# Patient Record
Sex: MALE | Race: WHITE | NOT HISPANIC OR LATINO | Employment: FULL TIME | URBAN - METROPOLITAN AREA
[De-identification: names, ages, dates, MRNs, and addresses within clinical notes are randomized per-mention and may not be internally consistent; named-entity substitution may affect disease eponyms.]

---

## 2022-05-26 ENCOUNTER — OFFICE VISIT (OUTPATIENT)
Dept: UROLOGY | Facility: CLINIC | Age: 47
End: 2022-05-26

## 2022-05-26 VITALS
BODY MASS INDEX: 29.51 KG/M2 | DIASTOLIC BLOOD PRESSURE: 66 MMHG | SYSTOLIC BLOOD PRESSURE: 121 MMHG | WEIGHT: 188 LBS | HEIGHT: 67 IN | HEART RATE: 69 BPM | TEMPERATURE: 98.7 F

## 2022-05-26 DIAGNOSIS — R35.0 BENIGN PROSTATIC HYPERPLASIA WITH URINARY FREQUENCY: Primary | ICD-10-CM

## 2022-05-26 DIAGNOSIS — R35.1 NOCTURIA: ICD-10-CM

## 2022-05-26 DIAGNOSIS — R35.0 URINARY FREQUENCY: ICD-10-CM

## 2022-05-26 DIAGNOSIS — N40.1 BENIGN PROSTATIC HYPERPLASIA WITH URINARY FREQUENCY: Primary | ICD-10-CM

## 2022-05-26 PROBLEM — K59.00 CONSTIPATION: Status: ACTIVE | Noted: 2022-04-05

## 2022-05-26 PROBLEM — E78.2 MIXED HYPERLIPIDEMIA: Status: ACTIVE | Noted: 2022-04-05

## 2022-05-26 LAB
BILIRUB BLD-MCNC: NEGATIVE MG/DL
CLARITY, POC: CLEAR
COLOR UR: YELLOW
GLUCOSE UR STRIP-MCNC: NEGATIVE MG/DL
KETONES UR QL: NEGATIVE
LEUKOCYTE EST, POC: NEGATIVE
NITRITE UR-MCNC: NEGATIVE MG/ML
PH UR: 5.5 [PH] (ref 5–8)
PROT UR STRIP-MCNC: NEGATIVE MG/DL
RBC # UR STRIP: NEGATIVE /UL
SP GR UR: 1.01 (ref 1–1.03)
UROBILINOGEN UR QL: NORMAL

## 2022-05-26 PROCEDURE — 99203 OFFICE O/P NEW LOW 30 MIN: CPT | Performed by: UROLOGY

## 2022-05-26 PROCEDURE — 81002 URINALYSIS NONAUTO W/O SCOPE: CPT | Performed by: UROLOGY

## 2022-05-26 RX ORDER — POLYETHYLENE GLYCOL 3350 17 G/17G
17 POWDER, FOR SOLUTION ORAL DAILY
COMMUNITY
Start: 2022-04-05

## 2022-05-26 RX ORDER — CHLORAL HYDRATE 500 MG
CAPSULE ORAL DAILY
COMMUNITY

## 2022-05-26 RX ORDER — TAMSULOSIN HYDROCHLORIDE 0.4 MG/1
CAPSULE ORAL
COMMUNITY
Start: 2022-05-03 | End: 2022-11-09 | Stop reason: SDUPTHER

## 2022-05-26 NOTE — PROGRESS NOTES
"Chief Complaint  Benign Prostatic Hypertrophy and Urinary Frequency    Nocturia    Subjective  No acute distress        Ziyad Rueda presents to [facility] UROLOGY  History of Present Illness    47-year-old white male has been having urinary frequency and nocturia for about 1 year's time.  No dysuria.  No gross hematuria.  Patient has nocturia about 4 times.  Frequency and sometimes he has to go every 30 minutes.  Patient has urgency and does not empty his bladder completely 80% of the time.    No family history of prostate cancer.  No history of diabetes mellitus    Objective No acute distress  Vital Signs:   /66   Pulse 69   Temp 98.7 °F (37.1 °C)   Ht 170.2 cm (67\")   Wt 85.3 kg (188 lb)   BMI 29.44 kg/m²     No Known Allergies   Past medical history:  has a past medical history of Anemia, Frequency of micturition, and Lymphocytosis.   Past surgical history:  has a past surgical history that includes Nose surgery.  Personal history: family history includes Heart disease in his father; Hypertension in his father.  Social history:  reports that he has never smoked. He has never used smokeless tobacco. He reports that he does not drink alcohol and does not use drugs.  Patient is  and has 2 children.    Review of Systems    Please see past medical and surgical history rest of the system is negative    Physical Exam  Constitutional:       General: He is not in acute distress.     Appearance: Normal appearance. He is normal weight. He is not ill-appearing or toxic-appearing.   HENT:      Head: Normocephalic and atraumatic.      Ears:      Comments: No loss of hearing     Mouth/Throat:      Mouth: Mucous membranes are moist.   Eyes:      Pupils: Pupils are equal, round, and reactive to light.   Cardiovascular:      Rate and Rhythm: Normal rate.      Pulses: Normal pulses.      Heart sounds: Normal heart sounds. No murmur heard.  Pulmonary:      Breath sounds: No rhonchi or " rales.   Abdominal:      General: Abdomen is flat.      Palpations: Abdomen is soft. There is no mass.      Tenderness: There is no abdominal tenderness. There is no right CVA tenderness or left CVA tenderness.      Hernia: No hernia is present.   Genitourinary:     Comments: Uncircumcised normal penis    Right left testicle and epididymis is normal    Scrotum is normal on both sides.    Prostate gland is just about 15 g and benign  Musculoskeletal:         General: No swelling. Normal range of motion.      Cervical back: Normal range of motion and neck supple. No rigidity or tenderness.      Right lower leg: No edema.      Left lower leg: No edema.   Lymphadenopathy:      Cervical: No cervical adenopathy.   Skin:     General: Skin is warm.      Coloration: Skin is not jaundiced.      Findings: No lesion.   Neurological:      General: No focal deficit present.      Mental Status: He is alert and oriented to person, place, and time. Mental status is at baseline.      Motor: No weakness.      Gait: Gait normal.   Psychiatric:         Mood and Affect: Mood normal.         Behavior: Behavior normal.         Thought Content: Thought content normal.         Judgment: Judgment normal.        Result Review :                 Assessment and Plan    Diagnoses and all orders for this visit:    1. Benign prostatic hyperplasia with urinary frequency (Primary)  -     POC Urinalysis Dipstick    2. Urinary frequency    3. Nocturia      Will do bladder diary and cystoscopy.  Patient has no suprapubic pain so I do not suspect IC.  Brief Urine Lab Results  (Last result in the past 365 days)      Color   Clarity   Blood   Leuk Est   Nitrite   Protein   CREAT   Urine HCG        05/26/22 1226 Yellow   Clear   Negative   Negative   Negative   Negative                  Follow Up   No follow-ups on file.  Patient was given instructions and counseling regarding his condition or for health maintenance advice. Please see specific information  pulled into the AVS if appropriate.     Madison Up MD

## 2022-06-14 ENCOUNTER — PROCEDURE VISIT (OUTPATIENT)
Dept: UROLOGY | Facility: CLINIC | Age: 47
End: 2022-06-14

## 2022-06-14 DIAGNOSIS — R35.0 BENIGN PROSTATIC HYPERPLASIA WITH URINARY FREQUENCY: Primary | ICD-10-CM

## 2022-06-14 DIAGNOSIS — N40.1 BENIGN PROSTATIC HYPERPLASIA WITH URINARY FREQUENCY: Primary | ICD-10-CM

## 2022-06-14 DIAGNOSIS — N32.81 OVERACTIVE BLADDER: ICD-10-CM

## 2022-06-14 PROCEDURE — 51741 ELECTRO-UROFLOWMETRY FIRST: CPT | Performed by: UROLOGY

## 2022-06-14 PROCEDURE — 52000 CYSTOURETHROSCOPY: CPT | Performed by: UROLOGY

## 2022-06-14 PROCEDURE — 51798 US URINE CAPACITY MEASURE: CPT | Performed by: UROLOGY

## 2022-06-14 NOTE — PROGRESS NOTES
Cystoscopy    Date/Time: 6/14/2022 1:22 PM  Performed by: Madison Up MD  Authorized by: Madison Up MD   Preparation: Patient was prepped and draped in the usual sterile fashion.  Local anesthesia used: yes    Anesthesia:  Local anesthesia used: yes  Local Anesthetic: topical anesthetic  Anesthetic total: 12 mL    Sedation:  Patient sedated: no        Indication.  BPH with obstructive uropathy and frequency.    Bladder diary is seen and his bladder volume varies from 400 mL to 25 ml    Patient was placed in lithotomy position.  Thorough scrubbing of lower abdomen and external genitalia was performed with Hibiclens.  18 Olympus flexible cystoscope was inserted into the urethra, which was normal.  Prostate gland is large and obstructive.  Patient has large lateral lobes and no median lobe.  Prostatic urethra was little tight.  Bladder is trabeculated.  Both ureteral orifices are normal.  Bladder was looked at carefully and I do not see any bladder tumors.  There was no evidence of vesicocolic fistula.  Bladder blood vessels are little congested but I do not see any changes of interstitial cystitis.  Patient had no pain when the bladder was full.  Flexible cystoscope was removed and patient tolerated the procedure very well.    Uroflow.    Q-Douglas.  12.2 mL/s    Average flow.  2.9 mL/s    Voiding volume.  345    Ultrasound residual with bladder scan.  3.5 MHz transducer was used which was placed in the suprapubic area and fluid in the urinary bladder was calculated.  Patient has residual of 14 mL.    Procedure was pretty uncomfortable for the patient.  I do not see any changes of interstitial cystitis.  Patient stopped taking tamsulosin so I will put him back on tamsulosin 0.4 mg PC at the same time daily and given 2 weeks course of 75 mg Gemtesa and see him back in 2 weeks to see how he is doing.

## 2022-07-07 ENCOUNTER — OFFICE VISIT (OUTPATIENT)
Dept: UROLOGY | Facility: CLINIC | Age: 47
End: 2022-07-07

## 2022-07-07 VITALS
TEMPERATURE: 98.9 F | BODY MASS INDEX: 25.74 KG/M2 | WEIGHT: 164 LBS | DIASTOLIC BLOOD PRESSURE: 71 MMHG | SYSTOLIC BLOOD PRESSURE: 120 MMHG | HEART RATE: 88 BPM | HEIGHT: 67 IN

## 2022-07-07 DIAGNOSIS — R35.0 BENIGN PROSTATIC HYPERPLASIA WITH URINARY FREQUENCY: ICD-10-CM

## 2022-07-07 DIAGNOSIS — R39.15 URINARY URGENCY: Primary | ICD-10-CM

## 2022-07-07 DIAGNOSIS — N40.1 BENIGN PROSTATIC HYPERPLASIA WITH URINARY FREQUENCY: ICD-10-CM

## 2022-07-07 LAB
BILIRUB BLD-MCNC: NEGATIVE MG/DL
CLARITY, POC: CLEAR
COLOR UR: YELLOW
EXPIRATION DATE: NORMAL
GLUCOSE UR STRIP-MCNC: NEGATIVE MG/DL
KETONES UR QL: NEGATIVE
LEUKOCYTE EST, POC: NEGATIVE
Lab: NORMAL
NITRITE UR-MCNC: NEGATIVE MG/ML
PH UR: 5 [PH] (ref 5–8)
PROT UR STRIP-MCNC: NEGATIVE MG/DL
RBC # UR STRIP: NEGATIVE /UL
SP GR UR: 1.03 (ref 1–1.03)
UROBILINOGEN UR QL: NORMAL

## 2022-07-07 PROCEDURE — 81003 URINALYSIS AUTO W/O SCOPE: CPT | Performed by: UROLOGY

## 2022-07-07 PROCEDURE — 99213 OFFICE O/P EST LOW 20 MIN: CPT | Performed by: UROLOGY

## 2022-07-07 RX ORDER — VIBEGRON 75 MG/1
75 TABLET, FILM COATED ORAL DAILY
Qty: 90 TABLET | Refills: 3 | Status: SHIPPED | OUTPATIENT
Start: 2022-07-07 | End: 2022-10-05

## 2022-07-07 NOTE — PROGRESS NOTES
"Chief Complaint  Benign Prostatic Hypertrophy    Urinary urgency and frequency    Subjective     {Problem List  Visit Diagnosis   Encounters  Notes  Medications  Labs  Result Review Imaging  Media :23}     Ziyad Rueda presents to [facility] UROLOGY  History of Present Illness    Patient is doing better with the combination of Flomax and Gemtesa.  His nocturia and frequency has improved.  He is urinating easier than before    Objective No acute distress  Vital Signs:   /71   Pulse 88   Temp 98.9 °F (37.2 °C) (Infrared)   Ht 170.2 cm (67\")   Wt 74.4 kg (164 lb)   BMI 25.69 kg/m²     No Known Allergies   Past medical history:  has a past medical history of Anemia, Benign prostatic hyperplasia, Frequency of micturition, and Lymphocytosis.   Past surgical history:  has a past surgical history that includes Nose surgery and Cystoscopy.  Personal history: family history includes Heart disease in his father; Hypertension in his father.  Social history:  reports that he has never smoked. He has never used smokeless tobacco. He reports that he does not drink alcohol and does not use drugs.    Review of Systems    No change from before    Physical Exam  Constitutional:       General: He is not in acute distress.     Appearance: Normal appearance. He is normal weight. He is not ill-appearing or toxic-appearing.   HENT:      Head: Normocephalic and atraumatic.      Ears:      Comments: No loss of hearing  Pulmonary:      Effort: Pulmonary effort is normal.   Abdominal:      General: Abdomen is flat.      Palpations: Abdomen is soft.   Genitourinary:     Penis: Normal.       Testes: Normal.   Musculoskeletal:         General: Normal range of motion.   Skin:     General: Skin is warm.      Coloration: Skin is not jaundiced.   Neurological:      General: No focal deficit present.      Mental Status: He is alert and oriented to person, place, and time.      Motor: No weakness.      Gait: " Gait normal.   Psychiatric:         Mood and Affect: Mood normal.         Behavior: Behavior normal.         Thought Content: Thought content normal.         Judgment: Judgment normal.        Result Review :                 Assessment and Plan    Diagnoses and all orders for this visit:    1. Benign prostatic hyperplasia with urinary frequency (Primary)  -     POC Urinalysis Dipstick, Automated    2. Urinary urgency      Will continue tamsulosin 0.4 mg daily PCHS and Gemtesa 75 mg daily and recheck him in 4 months time  Brief Urine Lab Results  (Last result in the past 365 days)      Color   Clarity   Blood   Leuk Est   Nitrite   Protein   CREAT   Urine HCG        07/07/22 1248 Yellow   Clear   Negative   Negative   Negative   Negative                  Follow Up   Return in about 4 months (around 11/7/2022).  Patient was given instructions and counseling regarding his condition or for health maintenance advice. Please see specific information pulled into the AVS if appropriate.     Madison Up MD

## 2022-07-20 ENCOUNTER — TRANSCRIBE ORDERS (OUTPATIENT)
Dept: ADMINISTRATIVE | Facility: HOSPITAL | Age: 47
End: 2022-07-20

## 2022-07-20 DIAGNOSIS — C94.80: Primary | ICD-10-CM

## 2022-07-21 ENCOUNTER — HOSPITAL ENCOUNTER (OUTPATIENT)
Dept: PET IMAGING | Facility: HOSPITAL | Age: 47
Discharge: HOME | End: 2022-07-21

## 2022-07-21 DIAGNOSIS — C94.80: ICD-10-CM

## 2022-07-21 PROCEDURE — 78815 PET IMAGE W/CT SKULL-THIGH: CPT

## 2022-07-21 PROCEDURE — A9552 F18 FDG: HCPCS | Performed by: INTERNAL MEDICINE

## 2022-07-21 PROCEDURE — 0 FLUDEOXYGLUCOSE F18 SOLUTION: Performed by: INTERNAL MEDICINE

## 2022-07-21 RX ADMIN — FLUDEOXYGLUCOSE F18 1 DOSE: 300 INJECTION INTRAVENOUS at 08:50

## 2022-07-28 DIAGNOSIS — N32.81 OVERACTIVE BLADDER: Primary | ICD-10-CM

## 2022-07-28 RX ORDER — OXYBUTYNIN CHLORIDE 5 MG/1
5 TABLET, EXTENDED RELEASE ORAL DAILY
Qty: 30 TABLET | Refills: 2 | Status: SHIPPED | OUTPATIENT
Start: 2022-07-28 | End: 2022-11-09 | Stop reason: SDUPTHER

## 2022-11-09 ENCOUNTER — OFFICE VISIT (OUTPATIENT)
Dept: UROLOGY | Facility: CLINIC | Age: 47
End: 2022-11-09

## 2022-11-09 VITALS
SYSTOLIC BLOOD PRESSURE: 114 MMHG | DIASTOLIC BLOOD PRESSURE: 64 MMHG | HEART RATE: 59 BPM | HEIGHT: 67 IN | BODY MASS INDEX: 26.53 KG/M2 | TEMPERATURE: 98 F | WEIGHT: 169 LBS

## 2022-11-09 DIAGNOSIS — N32.81 OVERACTIVE BLADDER: ICD-10-CM

## 2022-11-09 DIAGNOSIS — N13.8 BPH WITH OBSTRUCTION/LOWER URINARY TRACT SYMPTOMS: Primary | ICD-10-CM

## 2022-11-09 DIAGNOSIS — N40.1 BPH WITH OBSTRUCTION/LOWER URINARY TRACT SYMPTOMS: Primary | ICD-10-CM

## 2022-11-09 LAB
BILIRUB BLD-MCNC: NEGATIVE MG/DL
CLARITY, POC: CLEAR
COLOR UR: YELLOW
EXPIRATION DATE: NORMAL
GLUCOSE UR STRIP-MCNC: NEGATIVE MG/DL
KETONES UR QL: NEGATIVE
LEUKOCYTE EST, POC: NEGATIVE
Lab: NORMAL
NITRITE UR-MCNC: NEGATIVE MG/ML
PH UR: 7.5 [PH] (ref 5–8)
PROT UR STRIP-MCNC: NEGATIVE MG/DL
RBC # UR STRIP: NEGATIVE /UL
SP GR UR: 1.02 (ref 1–1.03)
UROBILINOGEN UR QL: NORMAL

## 2022-11-09 PROCEDURE — 99214 OFFICE O/P EST MOD 30 MIN: CPT | Performed by: UROLOGY

## 2022-11-09 PROCEDURE — 81003 URINALYSIS AUTO W/O SCOPE: CPT | Performed by: UROLOGY

## 2022-11-09 RX ORDER — OXYBUTYNIN CHLORIDE 5 MG/1
5 TABLET, EXTENDED RELEASE ORAL DAILY
Qty: 90 TABLET | Refills: 3 | Status: SHIPPED | OUTPATIENT
Start: 2022-11-09 | End: 2023-02-07

## 2022-11-09 RX ORDER — TAMSULOSIN HYDROCHLORIDE 0.4 MG/1
1 CAPSULE ORAL DAILY
Qty: 90 CAPSULE | Refills: 3 | Status: SHIPPED | OUTPATIENT
Start: 2022-11-09

## 2022-11-09 NOTE — PROGRESS NOTES
"Chief Complaint  OAB    BPH with obstructive uropathy    Subjective  Patient has improved from last time        Ziyad Rueda presents to [facility] UROLOGY  History of Present Illness patient is mostly satisfied by using a combination of tamsulosin and Ditropan XL.  Patient did the best when he was on Gemtesa but the insurance does not want to pay for Gemtesa.  Patient has no dysuria or gross hematuria.  He has nocturia twice.  He is emptying his urinary bladder 40% of the time.  Has intermittency 40% and weak stream  40%.    Patient does better at home as far as frequency is concerned but at work he goes more often but is drinking a lot of fluids    Objective No acute distress  Vital Signs:   /64   Pulse 59   Temp 98 °F (36.7 °C)   Ht 170.2 cm (67\")   Wt 76.7 kg (169 lb)   BMI 26.47 kg/m²     No Known Allergies   Past medical history:  has a past medical history of Anemia, Benign prostatic hyperplasia, Frequency of micturition, and Lymphocytosis.   Past surgical history:  has a past surgical history that includes Nose surgery and Cystoscopy.  Personal history: family history includes Heart disease in his father; Hypertension in his father.  Social history:  reports that he has never smoked. He has never used smokeless tobacco. He reports that he does not drink alcohol and does not use drugs.    Review of Systems    Please see past medical and surgical history and rest of the system is negative    Physical Exam  Constitutional:       General: He is not in acute distress.     Appearance: Normal appearance. He is normal weight. He is not ill-appearing or toxic-appearing.   HENT:      Head: Normocephalic and atraumatic.      Ears:      Comments: No hearing loss  Pulmonary:      Effort: Pulmonary effort is normal. No respiratory distress.   Abdominal:      Palpations: Abdomen is soft. There is no mass.      Tenderness: There is no abdominal tenderness. There is no right CVA tenderness " or left CVA tenderness.   Genitourinary:     Comments: Uncircumcised normal penis.    Right and left scrotum is normal.    Right and left testicles and epididymis is normal.  Musculoskeletal:         General: Normal range of motion.   Skin:     General: Skin is warm.      Coloration: Skin is not jaundiced.   Neurological:      General: No focal deficit present.      Mental Status: He is alert and oriented to person, place, and time.      Motor: No weakness.      Gait: Gait normal.   Psychiatric:         Mood and Affect: Mood normal.         Behavior: Behavior normal.         Thought Content: Thought content normal.         Judgment: Judgment normal.        Result Review :                 Assessment and Plan    Diagnoses and all orders for this visit:    1. BPH with obstruction/lower urinary tract symptoms (Primary)  -     tamsulosin (FLOMAX) 0.4 MG capsule 24 hr capsule; Take 1 capsule by mouth Daily.  Dispense: 90 capsule; Refill: 3    2. Overactive bladder  -     POC Urinalysis Dipstick, Automated  -     oxybutynin XL (DITROPAN-XL) 5 MG 24 hr tablet; Take 1 tablet by mouth Daily for 90 days.  Dispense: 90 tablet; Refill: 3  -     tamsulosin (FLOMAX) 0.4 MG capsule 24 hr capsule; Take 1 capsule by mouth Daily.  Dispense: 90 capsule; Refill: 3      Will continue Ditropan XL 5 mg daily and tamsulosin 0.4 mg PCHS.  Medication discussed again and informed him that tamsulosin is to be taken for stomach otherwise the absorption become erratic.    Patient has no side effect from Ditropan XL we will continue that.  Patient wants to retry some of the samples of Gemtesa which was given to him  Brief Urine Lab Results  (Last result in the past 365 days)      Color   Clarity   Blood   Leuk Est   Nitrite   Protein   CREAT   Urine HCG        11/09/22 0932 Yellow   Clear   Negative   Negative   Negative   Negative                  Follow Up   No follow-ups on file.  Patient was given instructions and counseling regarding his  condition or for health maintenance advice. Please see specific information pulled into the AVS if appropriate.     Madison Up MD

## 2023-05-10 ENCOUNTER — OFFICE VISIT (OUTPATIENT)
Dept: UROLOGY | Facility: CLINIC | Age: 48
End: 2023-05-10
Payer: COMMERCIAL

## 2023-05-10 VITALS
DIASTOLIC BLOOD PRESSURE: 67 MMHG | HEART RATE: 63 BPM | SYSTOLIC BLOOD PRESSURE: 121 MMHG | TEMPERATURE: 98.7 F | WEIGHT: 175 LBS | HEIGHT: 67 IN | BODY MASS INDEX: 27.47 KG/M2

## 2023-05-10 DIAGNOSIS — N13.8 BPH WITH OBSTRUCTION/LOWER URINARY TRACT SYMPTOMS: ICD-10-CM

## 2023-05-10 DIAGNOSIS — N40.1 BPH WITH OBSTRUCTION/LOWER URINARY TRACT SYMPTOMS: ICD-10-CM

## 2023-05-10 DIAGNOSIS — R35.0 URINARY FREQUENCY: Primary | ICD-10-CM

## 2023-05-10 LAB
BILIRUB BLD-MCNC: NEGATIVE MG/DL
CLARITY, POC: CLEAR
COLOR UR: YELLOW
EXPIRATION DATE: NORMAL
GLUCOSE UR STRIP-MCNC: NEGATIVE MG/DL
KETONES UR QL: NEGATIVE
LEUKOCYTE EST, POC: NEGATIVE
Lab: NORMAL
NITRITE UR-MCNC: NEGATIVE MG/ML
PH UR: 7 [PH] (ref 5–8)
PROT UR STRIP-MCNC: NEGATIVE MG/DL
RBC # UR STRIP: NEGATIVE /UL
SP GR UR: 1.02 (ref 1–1.03)
UROBILINOGEN UR QL: NORMAL

## 2023-05-10 RX ORDER — OXYBUTYNIN CHLORIDE 5 MG/1
1 TABLET, EXTENDED RELEASE ORAL DAILY
COMMUNITY
Start: 2023-05-04

## 2023-05-10 NOTE — PROGRESS NOTES
"Chief Complaint  Benign Prostatic Hypertrophy    Urinary frequency    Subjective  No acute distress        Ziyad Rueda presents to [facility] UROLOGY  History of Present Illness    Patient continues to have urinary frequency and nocturia.  Patient had better results with Gemtesa samples but is not doing as good with oxybutynin.  He has nocturia 3 times.  50% incomplete emptying of urinary bladder.  Lot of urgency and frequency.  AUA score 21/35 and quality score 4.    Patient is on tamsulosin also to relieve obstructive uropathy at the same time.  No dysuria or gross hematuria.    He does not have any neurological disease like parkinsonism or MS.    Objective No acute distress  Vital Signs:   /67   Pulse 63   Temp 98.7 °F (37.1 °C)   Ht 170.2 cm (67\")   Wt 79.4 kg (175 lb)   BMI 27.41 kg/m²     No Known Allergies   Past medical history:  has a past medical history of Anemia, Benign prostatic hyperplasia, Frequency of micturition, and Lymphocytosis.   Past surgical history:  has a past surgical history that includes Nose surgery and Cystoscopy.  Personal history: family history includes Heart disease in his father; Hypertension in his father.  Social history:  reports that he has never smoked. He has never used smokeless tobacco. He reports that he does not drink alcohol and does not use drugs.    Review of Systems    Please see past medical surgical history rest of the system is negative    Physical Exam  Constitutional:       General: He is not in acute distress.     Appearance: Normal appearance. He is normal weight. He is not ill-appearing or toxic-appearing.   HENT:      Head: Normocephalic and atraumatic.      Ears:      Comments: No hearing loss  Abdominal:      General: There is no distension.      Palpations: Abdomen is soft. There is no mass.      Tenderness: There is no abdominal tenderness. There is no right CVA tenderness or left CVA tenderness.      Hernia: No hernia " is present.   Genitourinary:     Comments: Uncircumcised normal penis.    Right and left scrotum is normal.    Right and left testes and epididymis is normal  Musculoskeletal:         General: No swelling. Normal range of motion.   Skin:     General: Skin is warm.      Coloration: Skin is not jaundiced.   Neurological:      General: No focal deficit present.      Mental Status: He is alert and oriented to person, place, and time.      Motor: No weakness.      Gait: Gait normal.   Psychiatric:         Mood and Affect: Mood normal.         Behavior: Behavior normal.         Thought Content: Thought content normal.         Judgment: Judgment normal.        Result Review :                 Assessment and Plan    Diagnoses and all orders for this visit:    1. Urinary frequency (Primary)    2. BPH with obstruction/lower urinary tract symptoms  -     Cancel: PSA DIAGNOSTIC  -     POC Urinalysis Dipstick, Automated  -     PSA DIAGNOSTIC; Future    I am going to give him Gemtesa again 75 mg daily at bedtime and recheck him in 1 month to see if that is helping him.  I will recheck him in 1 month's time.    Spent 23 minutes on the patient     Brief Urine Lab Results  (Last result in the past 365 days)      Color   Clarity   Blood   Leuk Est   Nitrite   Protein   CREAT   Urine HCG        05/10/23 0842 Yellow   Clear   Negative   Negative   Negative   Negative                  Follow Up   No follow-ups on file.  Patient was given instructions and counseling regarding his condition or for health maintenance advice. Please see specific information pulled into the AVS if appropriate.     Madison Up MD

## 2023-12-04 ENCOUNTER — TELEPHONE (OUTPATIENT)
Dept: UROLOGY | Facility: CLINIC | Age: 48
End: 2023-12-04
Payer: COMMERCIAL

## 2023-12-04 NOTE — TELEPHONE ENCOUNTER
LMTRC-     Pt has pending overdue lab result of PSA ordered by Dr. Up in May.     Noted pt has an appt with dr. Up on 12/11.     Calling to see if pt can have lab done prior to appt with dr. Up next week.     -clr

## 2023-12-11 ENCOUNTER — TELEPHONE (OUTPATIENT)
Dept: UROLOGY | Facility: CLINIC | Age: 48
End: 2023-12-11

## 2023-12-11 ENCOUNTER — OFFICE VISIT (OUTPATIENT)
Dept: UROLOGY | Facility: CLINIC | Age: 48
End: 2023-12-11
Payer: COMMERCIAL

## 2023-12-11 VITALS
DIASTOLIC BLOOD PRESSURE: 68 MMHG | HEIGHT: 67 IN | SYSTOLIC BLOOD PRESSURE: 109 MMHG | WEIGHT: 168.6 LBS | HEART RATE: 64 BPM | BODY MASS INDEX: 26.46 KG/M2

## 2023-12-11 DIAGNOSIS — R35.0 URINARY FREQUENCY: Primary | ICD-10-CM

## 2023-12-11 DIAGNOSIS — N32.81 OVERACTIVE BLADDER: ICD-10-CM

## 2023-12-11 LAB
BACTERIA UR QL AUTO: NORMAL /HPF
BILIRUB BLD-MCNC: NEGATIVE MG/DL
CLARITY, POC: CLEAR
COLOR UR: YELLOW
EPI CELLS #/AREA URNS HPF: NORMAL /[HPF]
EXPIRATION DATE: ABNORMAL
GLUCOSE UR STRIP-MCNC: NEGATIVE MG/DL
KETONES UR QL: NEGATIVE
LEUKOCYTE EST, POC: NEGATIVE
Lab: ABNORMAL
NITRITE UR-MCNC: NEGATIVE MG/ML
PH UR: 5.5 [PH] (ref 5–8)
PROT UR STRIP-MCNC: ABNORMAL MG/DL
RBC # UR STRIP: NEGATIVE /UL
RBC # UR STRIP: NORMAL /HPF
RENAL EPITHELIAL, POC: 0
SP GR UR: 1.02 (ref 1–1.03)
UNIDENT CRYS URNS QL MICRO: NORMAL /HPF
UROBILINOGEN UR QL: ABNORMAL
WBC # UR STRIP: NORMAL /HPF

## 2023-12-11 PROCEDURE — 99213 OFFICE O/P EST LOW 20 MIN: CPT | Performed by: UROLOGY

## 2023-12-11 PROCEDURE — 81003 URINALYSIS AUTO W/O SCOPE: CPT | Performed by: UROLOGY

## 2023-12-11 NOTE — PROGRESS NOTES
"Chief Complaint  Urinary Frequency    Overactive bladder    Subjective no acute distress        Ziyad Rueda presents to [facility] UROLOGY  History of Present Illness    48-year-old white male continues to have urinary frequency, urgency and nocturia.  Janinetesa is helping him.  He gets up twice at night.  Minor straining to urinate and stream is slow but smaller than it used to be.  Has some urgency but no urgency incontinence.  He does have dripping after he finishes urination.  Emptying his bladder without difficulty.  Overall AUA score is 10/35.  No dysuria or gross hematuria.  No family history of prostate cancer.    Objective no acute distress  Vital Signs:   /68 (BP Location: Right arm, Patient Position: Sitting, Cuff Size: Adult)   Pulse 64   Ht 170.2 cm (67.01\")   Wt 76.5 kg (168 lb 9.6 oz)   BMI 26.40 kg/m²     No Known Allergies   Past medical history:  has a past medical history of Anemia, Benign prostatic hyperplasia, Cancer (May 2022), Frequency of micturition, Lymphocytosis, and Urinary incontinence (2022).   Past surgical history:  has a past surgical history that includes Nose surgery and Cystoscopy.  Personal history: family history includes Heart disease in his father; Hypertension in his father.  Social history:  reports that he has never smoked. He has never used smokeless tobacco. He reports that he does not drink alcohol and does not use drugs.    Review of Systems    Please see past medical and surgical history rest of the system is negative.    Physical Exam  Constitutional:       General: He is not in acute distress.     Appearance: Normal appearance. He is normal weight. He is not ill-appearing or toxic-appearing.   HENT:      Head: Normocephalic and atraumatic.      Ears:      Comments: No loss of hearing  Abdominal:      Palpations: Abdomen is soft. There is no mass.      Tenderness: There is no abdominal tenderness. There is no right CVA tenderness or " left CVA tenderness.   Genitourinary:     Comments: Uncircumcised normal penis    Right and left scrotum is normal.    Right left testicle and epididymis is normal.    LUZ.  Prostate gland is just about 20 g and benign  Musculoskeletal:         General: No swelling. Normal range of motion.   Skin:     General: Skin is warm.      Coloration: Skin is not jaundiced.   Neurological:      General: No focal deficit present.      Mental Status: He is alert and oriented to person, place, and time.      Motor: No weakness.      Gait: Gait normal.   Psychiatric:         Mood and Affect: Mood normal.         Behavior: Behavior normal.         Thought Content: Thought content normal.         Judgment: Judgment normal.        Result Review :                 Assessment and Plan    Diagnoses and all orders for this visit:    1. Urinary frequency (Primary)  -     POC Urinalysis Dipstick, Automated    2. Overactive bladder    I have discussed InterStim with the patient but he is not interested.  Kegel exercises discussed.to stop His postvoid dribbling.  CMP was done last year which is normal.  Patient is asked to have a CMP done by his PCP to make sure his BUN/creatinine is okay.     Brief Urine Lab Results  (Last result in the past 365 days)        Color   Clarity   Blood   Leuk Est   Nitrite   Protein   CREAT   Urine HCG        12/11/23 0924 Yellow   Clear   Negative   Negative   Negative   100 mg/dL                    Follow Up   No follow-ups on file.  Patient was given instructions and counseling regarding his condition or for health maintenance advice. Please see specific information pulled into the AVS if appropriate.     Madison Up MD

## 2023-12-11 NOTE — TELEPHONE ENCOUNTER
Dr. Up,    Theres an order for a PSA on pt in the overdue results.     You saw pt today 12/11. I had attempted to call pt and ask him to have lab done prior to his appt with you today- but he didn't answer and never returned my call.     He has a follow up with you in June. Do you want pt to go ahead and have PSA done now - I don't see that you reordered this lab at todays visit. Or would you rather pt have done when he's seen in June?     Looks like last PSA was 4/19/2022  PSA SCREEN (04/19/2022 10:26)     Thanks so much!  Nancy

## 2023-12-13 NOTE — TELEPHONE ENCOUNTER
Noted.    Post-pone pts order until 2024. Order will  on 5/10/2024. Will reach out to pt closer to time for pt to have PSA. Pts follow up is scheduled 2024.    -clr

## 2024-06-20 ENCOUNTER — OFFICE VISIT (OUTPATIENT)
Dept: UROLOGY | Facility: CLINIC | Age: 49
End: 2024-06-20
Payer: COMMERCIAL

## 2024-06-20 VITALS
DIASTOLIC BLOOD PRESSURE: 64 MMHG | BODY MASS INDEX: 26.74 KG/M2 | WEIGHT: 170.4 LBS | HEIGHT: 67 IN | HEART RATE: 85 BPM | SYSTOLIC BLOOD PRESSURE: 116 MMHG | TEMPERATURE: 98.6 F

## 2024-06-20 DIAGNOSIS — R35.0 URINARY FREQUENCY: Primary | ICD-10-CM

## 2024-06-20 DIAGNOSIS — N32.81 OVERACTIVE BLADDER: ICD-10-CM

## 2024-06-20 DIAGNOSIS — N40.1 BPH WITH OBSTRUCTION/LOWER URINARY TRACT SYMPTOMS: ICD-10-CM

## 2024-06-20 DIAGNOSIS — N13.8 BPH WITH OBSTRUCTION/LOWER URINARY TRACT SYMPTOMS: ICD-10-CM

## 2024-06-20 LAB
BILIRUB BLD-MCNC: NEGATIVE MG/DL
CLARITY, POC: CLEAR
COLOR UR: YELLOW
EXPIRATION DATE: ABNORMAL
GLUCOSE UR STRIP-MCNC: ABNORMAL MG/DL
KETONES UR QL: NEGATIVE
LEUKOCYTE EST, POC: NEGATIVE
Lab: ABNORMAL
NITRITE UR-MCNC: NEGATIVE MG/ML
PH UR: 5.5 [PH] (ref 5–8)
PROT UR STRIP-MCNC: NEGATIVE MG/DL
RBC # UR STRIP: NEGATIVE /UL
SP GR UR: 1.03 (ref 1–1.03)
UROBILINOGEN UR QL: ABNORMAL

## 2024-06-20 RX ORDER — TAMSULOSIN HYDROCHLORIDE 0.4 MG/1
1 CAPSULE ORAL DAILY
Qty: 90 CAPSULE | Refills: 3 | Status: SHIPPED | OUTPATIENT
Start: 2024-06-20

## 2024-06-20 RX ORDER — MIRABEGRON 25 MG/1
25 TABLET, FILM COATED, EXTENDED RELEASE ORAL DAILY
Qty: 30 TABLET | Refills: 0 | COMMUNITY
Start: 2024-06-20

## 2024-06-20 NOTE — PROGRESS NOTES
"Chief Complaint  Urinary Frequency (6 month follow up; better at night and getting up about 2 times; needs gemtesa refill)    Patient was on Gemtesa 75 mg daily prophy do not have samples and cannot afford to pay through insurance    Subjective no acute distress        Ziyad Rueda presents to [facility]  History of Present Illness    49-year-old white male has overactive urinary bladder since 2022.  Patient has been on Gemtesa 75 mg which is helping him but his insurance does not cover for it.  He is to give him samples but we do not have samples at this time.  Patient has nocturia twice and has to strain to urinate.  He is emptying his urinary bladder but does have urgency and frequency.  AUA score is 14/35 and quality score is 2.  Patient says urgency half the time but is not leaking urine accept postvoid dribbling at that time he Acitra about 2 which comes out.  Does have nocturia h twice frequency 9-10 times in the daytime.    Objective no acute distress  Vital Signs:   /64 (BP Location: Left arm, Patient Position: Sitting, Cuff Size: Adult)   Pulse 85   Temp 98.6 °F (37 °C) (Temporal)   Ht 170.2 cm (67.01\")   Wt 77.3 kg (170 lb 6.4 oz)   BMI 26.68 kg/m²     No Known Allergies   Past medical history:  has a past medical history of Anemia, Benign prostatic hyperplasia, Cancer (May 2022), Frequency of micturition, Lymphocytosis, and Urinary incontinence (2022).   Past surgical history:  has a past surgical history that includes Nose surgery and Cystoscopy.  Personal history: family history includes Heart disease in his father; Hypertension in his father.  Social history:  reports that he has never smoked. He has never been exposed to tobacco smoke. He has never used smokeless tobacco. He reports that he does not drink alcohol and does not use drugs.    Review of Systems    Please see past medical and surgical history    Physical Exam  Constitutional:       General: He " is not in acute distress.     Appearance: Normal appearance. He is normal weight. He is not ill-appearing or toxic-appearing.   HENT:      Head: Normocephalic and atraumatic.      Ears:      Comments: No loss of hearing  Pulmonary:      Effort: Pulmonary effort is normal. No respiratory distress.   Abdominal:      General: There is no distension.      Palpations: There is no mass.      Tenderness: There is no abdominal tenderness. There is no right CVA tenderness or left CVA tenderness.      Hernia: No hernia is present.   Genitourinary:     Comments: Penis is  uncircumcised  is normal.    Right and left scrotum is normal.    Right left testicle and epididymis is normal.    LUZ.  Prostate gland is just about 15 g and benign  Musculoskeletal:         General: Normal range of motion.   Skin:     General: Skin is warm.      Coloration: Skin is not jaundiced.   Neurological:      General: No focal deficit present.      Mental Status: He is alert and oriented to person, place, and time.      Motor: No weakness.      Gait: Gait normal.   Psychiatric:         Mood and Affect: Mood normal.         Behavior: Behavior normal.         Thought Content: Thought content normal.         Judgment: Judgment normal.        Result Review :                 Assessment and Plan    Diagnoses and all orders for this visit:    1. Urinary frequency (Primary)  -     POC Urinalysis Dipstick, Automated  -     Mirabegron ER (MYRBETRIQ) 25 MG tablet sustained-release 24 hour 24 hr tablet; Take 1 tablet by mouth Daily.  Dispense: 30 tablet; Refill: 0    2. Overactive bladder  -     POC Urinalysis Dipstick, Automated  -     Mirabegron ER (MYRBETRIQ) 25 MG tablet sustained-release 24 hour 24 hr tablet; Take 1 tablet by mouth Daily.  Dispense: 30 tablet; Refill: 0  -     tamsulosin (FLOMAX) 0.4 MG capsule 24 hr capsule; Take 1 capsule by mouth Daily.  Dispense: 90 capsule; Refill: 3    3. BPH with obstruction/lower urinary tract symptoms  -      tamsulosin (FLOMAX) 0.4 MG capsule 24 hr capsule; Take 1 capsule by mouth Daily.  Dispense: 90 capsule; Refill: 3    Gemtesa is not available so I am going to try him on Myrbetriq 25 mg.  After 1 week is not helping him he can go to 50 mg.     Brief Urine Lab Results  (Last result in the past 365 days)        Color   Clarity   Blood   Leuk Est   Nitrite   Protein   CREAT   Urine HCG        06/20/24 1327 Yellow   Clear   Negative   Negative   Negative   Negative                    Follow Up   No follow-ups on file.  Patient was given instructions and counseling regarding his condition or for health maintenance advice. Please see specific information pulled into the AVS if appropriate.     Madison Up MD

## 2024-09-26 ENCOUNTER — DOCUMENTATION (OUTPATIENT)
Dept: UROLOGY | Facility: CLINIC | Age: 49
End: 2024-09-26
Payer: COMMERCIAL